# Patient Record
Sex: MALE | Race: WHITE | Employment: FULL TIME | URBAN - METROPOLITAN AREA
[De-identification: names, ages, dates, MRNs, and addresses within clinical notes are randomized per-mention and may not be internally consistent; named-entity substitution may affect disease eponyms.]

---

## 2024-02-05 ENCOUNTER — TELEPHONE (OUTPATIENT)
Dept: SURGERY | Facility: CLINIC | Age: 59
End: 2024-02-05

## 2024-02-05 NOTE — TELEPHONE ENCOUNTER
Patient is requesting to speak with clinical staff in regards to surgery that is coming up on 02/19/24 with Dr. Anderson. Patient would like to know what he is supposed to have completed prior to surgery.

## 2024-02-07 NOTE — TELEPHONE ENCOUNTER
S/w: pt regarding required preops. Patient stated he had preop appt 2/6 with PCP Dr. Richy Hernandes MD and had all preops completed.

## 2024-02-16 PROBLEM — M54.12 CERVICAL RADICULOPATHY: Status: ACTIVE | Noted: 2022-08-09

## 2024-02-16 PROBLEM — N40.0 BPH (BENIGN PROSTATIC HYPERPLASIA): Status: ACTIVE | Noted: 2022-08-09

## 2024-02-16 PROBLEM — E78.5 HLD (HYPERLIPIDEMIA): Status: ACTIVE | Noted: 2020-12-04

## 2024-02-16 PROBLEM — G47.33 OSA (OBSTRUCTIVE SLEEP APNEA): Status: ACTIVE | Noted: 2019-02-21

## 2024-02-16 NOTE — TELEPHONE ENCOUNTER
Rcv'd 2/6/24 preop exam from PCP Dr. Hernandes. \"Labs unremarkable. Patient is low risk to proceed with the above surgery\"    Faxed to [phone]. Placed in bin for scanning

## 2024-03-05 ENCOUNTER — OFFICE VISIT (OUTPATIENT)
Dept: SURGERY | Facility: CLINIC | Age: 59
End: 2024-03-05
Payer: COMMERCIAL

## 2024-03-05 VITALS
BODY MASS INDEX: 30.1 KG/M2 | SYSTOLIC BLOOD PRESSURE: 135 MMHG | DIASTOLIC BLOOD PRESSURE: 83 MMHG | HEART RATE: 69 BPM | WEIGHT: 215 LBS | HEIGHT: 71 IN

## 2024-03-05 DIAGNOSIS — Z98.890 STATUS POST CARPAL TUNNEL RELEASE: Primary | ICD-10-CM

## 2024-03-05 PROCEDURE — 99024 POSTOP FOLLOW-UP VISIT: CPT

## 2024-03-05 PROCEDURE — 3075F SYST BP GE 130 - 139MM HG: CPT

## 2024-03-05 PROCEDURE — 3008F BODY MASS INDEX DOCD: CPT

## 2024-03-05 PROCEDURE — 3079F DIAST BP 80-89 MM HG: CPT

## 2024-03-05 NOTE — PROGRESS NOTES
CHAUNCEY JULES M.D., F.A.A.N.S.     of Neurosurgery  [facility]  Board Certified Neurosurgeon                          [facility], [facility], [facility]        [address]  [address]  [place] [address]    PHONE  [phone]          FAX  [phone]    [email or web address]      OFFICE FOLLOW-UP NOTE      Adrián Hope    : 1965    MRN: [id number]  CSN: [id number]      PCP: Richy Hernandes MD  Referring Provider: No ref. provider found    Insurance: Payor: [facility] PPO / Plan: BCBS PPO / Product Type: PPO /     Date of Visit: 3/5/2024    Reason for Visit:   Chief Complaint    Post-Op                         History of Present Care:  Adrián Hope is a a(n) 58 year old, male.  2 weeks status post right carpal tunnel release.  He is doing very well with resolution of the majority of his preoperative symptoms in his right hand.  He is very happy with the outcome so far.  There are no incisional issues.      History:  .  Past Medical History:   Diagnosis Date    Sleep apnea     wears cpap mask      Past Surgical History:   Procedure Laterality Date    COLONOSCOPY      SPINE SURGERY PROCEDURE UNLISTED  2022    neck sx      No family history on file.   Social History     Socioeconomic History    Marital status:      Spouse name: Not on file    Number of children: Not on file    Years of education: Not on file    Highest education level: Not on file   Occupational History    Not on file   Tobacco Use    Smoking status: Never    Smokeless tobacco: Not on file   Substance and Sexual Activity    Alcohol use: Yes    Drug use: Yes     Types: Cannabis     Comment: gummies    Sexual activity: Not on file   Other Topics Concern    Not on file   Social History Narrative    Not on file     Social Determinants of Health     Financial Resource Strain:  Not on file   Food Insecurity: Not on file   Transportation Needs: Not on file   Physical Activity: Not on file   Stress: Not on file   Social Connections: Not on file   Housing Stability: Not on file        Allergies:  Allergies   Allergen Reactions    Penicillins OTHER (SEE COMMENTS), RASH and SHORTNESS OF BREATH     Confirmed rash only as reaction    Azithromycin DIARRHEA    Levofloxacin RASH     Joint pain         Medications:  Current Outpatient Medications   Medication Sig Dispense Refill    Elastic Bandages & Supports (SLING & SWATHE SHLDR IMMOBILIZ) Does not apply Misc Accommodator shoulder immobilizer L 1 each 0    ascorbic acid 100 MG Oral Tab Take 100 mg by mouth daily.      Cholecalciferol (VITAMIN D3) 25 MCG (1000 UT) Oral Cap Take 1,000 Units by mouth daily.      GINKGO BILOBA EXTRACT OR Take 40 mg by mouth daily.      Omega-3 Fatty Acids (THE VERY FINEST FISH OIL) Oral Liquid Take 500 mg by mouth daily.      tamsulosin 0.4 MG Oral Cap Take 0.4 mg by mouth daily.          Review of Systems:  A 10-point system was reviewed.  Pertinent positives and negatives are noted in HPI.      Physical Exam:  /83 (BP Location: Left arm, Patient Position: Sitting, Cuff Size: adult)   Pulse 69   Ht 71\"   Wt 215 lb (97.5 kg)   BMI 29.99 kg/m²         Neurological Exam:    AAOx3, following commands  PERRLA  EOMI  Face symmetrical  Tongue midline  Hearing symmetrical and intact to finger rub    No rhinorrhea or otorrhea    Romberg negative    Motor Strength:  Intact with some hesitancy with right     Sensation to light touch:  Intact    Incision:  Clean dry and intact, retention nylon stitches still in place    Abdomen:  Soft, non-distended, non-tender, with no rebound or guarding.  No peritoneal signs.     Extremities:  Non-tender, no lower extremity edema noted.      Labs:  CBC:  No results found for: \"WBC\", \"HGB\", \"HEMOGLOBIN\", \"HCT\", \"MCV\", \"PLT\"   BMG:  No results found for: \"GLUF\", \"NA\", \"K\", \"CO2\",  \"CL\", \"BUN\", \"CREATININE\"   INR:  No results found for: \"INR\", \"PROTIME\"       Diagnostics:  No new imaging    Diagnosis:  1. Status post carpal tunnel release      Assessment/Plan:  At this point in time, our patient is allowed to start physical therapy and we will provide him with a prescription.  He will follow-up next week for removal of stitches.  He agrees with this plan.    More than 30 minutes were spent during this visit and the coordination of this patient's care. All questions and concerns were addressed. We appreciate the opportunity to participate in the care of this patient. Please do not hesitate to call our office ([phone]) with any issues.        Elbert Anderson M.D., F.A.A.N.S.    3/5/2024  2:19 PM    This note has been dictated utilizing voice recognition software. Unfortunately, this may lead to occasional typographical errors. If there are any questions regarding this, please do not hesitate to contact our office.

## 2024-03-07 ENCOUNTER — TELEPHONE (OUTPATIENT)
Dept: SURGERY | Facility: CLINIC | Age: 59
End: 2024-03-07

## 2024-03-12 ENCOUNTER — OFFICE VISIT (OUTPATIENT)
Dept: SURGERY | Facility: CLINIC | Age: 59
End: 2024-03-12
Payer: COMMERCIAL

## 2024-03-12 VITALS — DIASTOLIC BLOOD PRESSURE: 90 MMHG | HEART RATE: 74 BPM | SYSTOLIC BLOOD PRESSURE: 140 MMHG

## 2024-03-12 DIAGNOSIS — Z98.890 STATUS POST CARPAL TUNNEL RELEASE: Primary | ICD-10-CM

## 2024-03-12 PROBLEM — G89.29 CHRONIC LLQ PAIN: Status: ACTIVE | Noted: 2019-01-17

## 2024-03-12 PROBLEM — K63.5 POLYP OF TRANSVERSE COLON: Status: ACTIVE | Noted: 2024-03-12

## 2024-03-12 PROBLEM — R10.32 CHRONIC LLQ PAIN: Status: ACTIVE | Noted: 2019-01-17

## 2024-03-12 PROBLEM — H93.13 TINNITUS OF BOTH EARS: Status: ACTIVE | Noted: 2021-07-16

## 2024-03-12 PROCEDURE — 3077F SYST BP >= 140 MM HG: CPT

## 2024-03-12 PROCEDURE — 99024 POSTOP FOLLOW-UP VISIT: CPT

## 2024-03-12 PROCEDURE — 3080F DIAST BP >= 90 MM HG: CPT

## 2024-03-12 NOTE — PROGRESS NOTES
[facility] Neurosurgery        [facility]      [address]  [address]  [place] [address]    PHONE  [phone]          FAX  [phone]    [email or web address]      OFFICE FOLLOW-UP NOTE            Adrián Hope    : 1965    MRN: [id number]  CSN: [id number]      PCP: Richy Hernandes MD  Referring Provider: No ref. provider found    Insurance: Payor: [facility] PPO / Plan: BCBS PPO / Product Type: PPO /           Date of Visit: 3/12/2024    Reason for Visit:   Chief Complaint    Post-Op                         History of Present Care:  Adrián Hope is a a(n) 58 year old, male 3 weeks status post right carpal tunnel release.  Patient reports resolution of his right hand tingling and numbness.  He denies incisional site erythema, redness, discharge.  He does feel the incision is not well-approximated.  There remains 2 retention sutures in place.    History:   Past Medical History:   Diagnosis Date    Sleep apnea     wears cpap mask      Past Surgical History:   Procedure Laterality Date    COLONOSCOPY      SPINE SURGERY PROCEDURE UNLISTED  2022    neck sx      History reviewed. No pertinent family history.   Social History     Socioeconomic History    Marital status:      Spouse name: Not on file    Number of children: Not on file    Years of education: Not on file    Highest education level: Not on file   Occupational History    Not on file   Tobacco Use    Smoking status: Never    Smokeless tobacco: Not on file   Substance and Sexual Activity    Alcohol use: Yes    Drug use: Yes     Types: Cannabis     Comment: gummies    Sexual activity: Not on file   Other Topics Concern    Not on file   Social History Narrative    Not on file     Social Determinants of Health     Financial Resource Strain: Not on file   Food Insecurity: Not on file   Transportation Needs: Not on file   Physical Activity: Not on file   Stress:  Not on file   Social Connections: Not on file   Housing Stability: Not on file        Allergies:  Allergies   Allergen Reactions    Penicillins OTHER (SEE COMMENTS), RASH and SHORTNESS OF BREATH     Confirmed rash only as reaction    Azithromycin DIARRHEA    Levofloxacin RASH     Joint pain         Medications:  Current Outpatient Medications   Medication Sig Dispense Refill    Elastic Bandages & Supports (SLING & SWATHE SHLDR IMMOBILIZ) Does not apply Misc Accommodator shoulder immobilizer L 1 each 0    ascorbic acid 100 MG Oral Tab Take 100 mg by mouth daily.      Cholecalciferol (VITAMIN D3) 25 MCG (1000 UT) Oral Cap Take 1,000 Units by mouth daily.      GINKGO BILOBA EXTRACT OR Take 40 mg by mouth daily.      Omega-3 Fatty Acids (THE VERY FINEST FISH OIL) Oral Liquid Take 500 mg by mouth daily.      tamsulosin 0.4 MG Oral Cap Take 0.4 mg by mouth daily.          Review of Systems:  A 10-point system was reviewed.  Pertinent positives and negatives are noted in HPI.      Physical Exam:  /90 (BP Location: Right arm, Patient Position: Sitting, Cuff Size: large)   Pulse 74         Neurological Exam:    Motor Strength:  Strength in the upper extremities is intact and symmetric    Sensation to light touch:  Intact in bilateral upper extremities throughout    Incision:  Clean, dry, intact.  There is a 1 cm portion in the center of the incision which is not well-approximated.  There is no erythema tenderness or discharge.    Abdomen:  Soft, non-distended, non-tender, with no rebound or guarding.  No peritoneal signs.     Extremities:  Non-tender, no lower extremity edema noted.      Labs:  CBC:  No results found for: \"WBC\", \"HGB\", \"HEMOGLOBIN\", \"HCT\", \"MCV\", \"PLT\"   BMG:  No results found for: \"GLUF\", \"NA\", \"K\", \"CO2\", \"CL\", \"BUN\", \"CREATININE\"   INR:  No results found for: \"INR\", \"PROTIME\"       Diagnostics:  None to review    Diagnosis:  1. Status post carpal tunnel release      Assessment/Plan:  Adrián FLORES  Jayy presents to the office 3 weeks status post right carpal tunnel release.  Patient reports great improvement in his right upper extremity paresthesia.  I removed 3 retention sutures from the hand today.  I placed 4 butterfly stitches over the incision and advised patient to limit use of the hand to allow for healing.  Patient does not need to follow back up in our office unless there is concerns for incision site.  We are happy to see him back in the office at that time.     More than 30 minutes were spent during this visit and the coordination of this patient's care. All questions and concerns were addressed. We appreciate the opportunity to participate in the care of this patient. Please do not hesitate to call our office ([phone]) with any issues.    This note has been dictated utilizing voice recognition software. Unfortunately, this may lead to occasional typographical errors. If there are any questions regarding this, please do not hesitate to contact our office.       [place] PA-C    3/12/2024  2:37 PM

## 2024-03-18 ENCOUNTER — PATIENT MESSAGE (OUTPATIENT)
Dept: SURGERY | Facility: CLINIC | Age: 59
End: 2024-03-18

## 2024-03-18 NOTE — TELEPHONE ENCOUNTER
From: Adrián Hope  To: [place]  Sent: 3/18/2024 8:56 AM CDT  Subject: Healing     I don’t think it’s infected, it’s just in a bad place. I keep covered and wear wrist brace while at work and sleeping to keep clean and so not to tear open anymore.     I tried bacitracin once and it made things to moist and prone to tearing. The incision stings and itches which I believe is a good thing part of healing process. It looks as though it’s healing from the inside out. I just want to know if there’s anything I can do to help it along. Thoughts?

## 2024-03-18 NOTE — TELEPHONE ENCOUNTER
Noted the patient message listed below.     Pt is requesting recommendations regarding incision healing and brace placement.     Noted the patient LOV  on 3/12/2024 with BUCKY Keyes and Dr. Anderson:  \"Assessment/Plan:  Adrián Hope presents to the office 3 weeks status post right carpal tunnel release.  Patient reports great improvement in his right upper extremity paresthesia.  I removed 3 retention sutures from the hand today.  I placed 4 butterfly stitches over the incision and advised patient to limit use of the hand to allow for healing.  Patient does not need to follow back up in our office unless there is concerns for incision site.  We are happy to see him back in the office at that time.      More than 30 minutes were spent during this visit and the coordination of this patient's care. All questions and concerns were addressed. We appreciate the opportunity to participate in the care of this patient. Please do not hesitate to call our office ([phone]) with any issues.\"

## 2024-03-19 NOTE — TELEPHONE ENCOUNTER
Message below from provider noted.    [employer] message sent to pt to inform.    Nothing further needed at this time.

## 2024-03-19 NOTE — TELEPHONE ENCOUNTER
Does not appear infected.  I agree with patient it is in a difficult place.  It will heal from the inside out and will take time.  Continue to keep it covered, clean and wear the wrist splint when active to improve healing.

## 2024-04-09 ENCOUNTER — OFFICE VISIT (OUTPATIENT)
Dept: SURGERY | Facility: CLINIC | Age: 59
End: 2024-04-09
Payer: COMMERCIAL

## 2024-04-09 VITALS
DIASTOLIC BLOOD PRESSURE: 82 MMHG | HEART RATE: 82 BPM | WEIGHT: 219 LBS | SYSTOLIC BLOOD PRESSURE: 135 MMHG | BODY MASS INDEX: 30.66 KG/M2 | HEIGHT: 71 IN

## 2024-04-09 DIAGNOSIS — Z98.890 STATUS POST CARPAL TUNNEL RELEASE: Primary | ICD-10-CM

## 2024-04-09 PROCEDURE — 99024 POSTOP FOLLOW-UP VISIT: CPT

## 2024-04-09 PROCEDURE — 3008F BODY MASS INDEX DOCD: CPT

## 2024-04-09 PROCEDURE — 3079F DIAST BP 80-89 MM HG: CPT

## 2024-04-09 PROCEDURE — 3075F SYST BP GE 130 - 139MM HG: CPT

## 2024-04-09 RX ORDER — DOXYCYCLINE HYCLATE 100 MG/1
CAPSULE ORAL
COMMUNITY

## 2024-04-09 NOTE — PATIENT INSTRUCTIONS
Refill policies:    Allow 2-3 business days for refills; controlled substances may take longer.  Contact your pharmacy at least 5 days prior to running out of medication and have them send an electronic request or submit request through the “request refill” option in your [employer] account.  Refills are not addressed on weekends; covering physicians do not authorize routine medications on weekends.  No narcotics or controlled substances are refilled after noon on Fridays or by on call physicians.  By law, narcotics must be electronically prescribed.  A 30 day supply with no refills is the maximum allowed.  If your prescription is due for a refill, you may be due for a follow up appointment.  To best provide you care, patients receiving routine medications need to be seen at least once a year.  Patients receiving narcotic/controlled substance medications need to be seen at least once every 3 months.  In the event that your preferred pharmacy does not have the requested medication in stock (e.g. Backordered), it is your responsibility to find another pharmacy that has the requested medication available.  We will gladly send a new prescription to that pharmacy at your request.    Scheduling Tests:    If your physician has ordered radiology tests such as MRI or CT scans, please contact Central Scheduling at [phone] right away to schedule the test.  Once scheduled, the [facility] Centralized Referral Team will work with your insurance carrier to obtain pre-certification or prior authorization.  Depending on your insurance carrier, approval may take 3-10 days.  It is highly recommended patients assure they have received an authorization before having a test performed.  If test is done without insurance authorization, patient may be responsible for the entire amount billed.      Precertification and Prior Authorizations:  If your physician has recommended that you have a procedure or additional testing performed the [facility]  Centralized Referral Team will contact your insurance carrier to obtain pre-certification or prior authorization.    You are strongly encouraged to contact your insurance carrier to verify that your procedure/test has been approved and is a COVERED benefit.  Although the [facility] Centralized Referral Team does its due diligence, the insurance carrier gives the disclaimer that \"Although the procedure is authorized, this does not guarantee payment.\"    Ultimately the patient is responsible for payment.   Thank you for your understanding in this matter.  Paperwork Completion:  If you require FMLA or disability paperwork for your recovery, please make sure to either drop it off or have it faxed to our office at [phone]. Be sure the form has your name and date of birth on it.  The form will be faxed to our Forms Department and they will complete it for you.  There is a 25$ fee for all forms that need to be filled out.  Please be aware there is a 10-14 day turnaround time.  You will need to sign a release of information (WATSON) form if your paperwork does not come with one.  You may call the Forms Department with any questions at [phone].  Their fax number is [phone].

## 2024-04-09 NOTE — PROGRESS NOTES
Patient is in to follow up - Post OP  Last office visit: 3/12/24  Last procedure: 2/19/24  Physical Therapy / Injections: Patient denies completing Physical Therapy / Injections  Numbness / Tingling: Patient denies numbness and tingling.   Pain Level: 1/10. Patient states that they are having pain located on their right wrist where incision is located.